# Patient Record
Sex: FEMALE | Race: BLACK OR AFRICAN AMERICAN | NOT HISPANIC OR LATINO | URBAN - METROPOLITAN AREA
[De-identification: names, ages, dates, MRNs, and addresses within clinical notes are randomized per-mention and may not be internally consistent; named-entity substitution may affect disease eponyms.]

---

## 2020-08-10 ENCOUNTER — OFFICE VISIT (OUTPATIENT)
Dept: URBAN - METROPOLITAN AREA CLINIC 25 | Facility: CLINIC | Age: 40
End: 2020-08-10
Payer: COMMERCIAL

## 2020-08-10 ENCOUNTER — LAB OUTSIDE AN ENCOUNTER (OUTPATIENT)
Dept: URBAN - METROPOLITAN AREA CLINIC 25 | Facility: CLINIC | Age: 40
End: 2020-08-10

## 2020-08-10 DIAGNOSIS — R14.0 BLOATING AND GAS: ICD-10-CM

## 2020-08-10 DIAGNOSIS — R10.10 UPPER ABDOMINAL PAIN: ICD-10-CM

## 2020-08-10 DIAGNOSIS — K59.01 SLOW TRANSIT CONSTIPATION: ICD-10-CM

## 2020-08-10 DIAGNOSIS — K21.9 GASTROESOPHAGEAL REFLUX DISEASE, ESOPHAGITIS PRESENCE NOT SPECIFIED: ICD-10-CM

## 2020-08-10 PROCEDURE — G9903 PT SCRN TBCO ID AS NON USER: HCPCS | Performed by: INTERNAL MEDICINE

## 2020-08-10 PROCEDURE — G8417 CALC BMI ABV UP PARAM F/U: HCPCS | Performed by: INTERNAL MEDICINE

## 2020-08-10 PROCEDURE — G8427 DOCREV CUR MEDS BY ELIG CLIN: HCPCS | Performed by: INTERNAL MEDICINE

## 2020-08-10 PROCEDURE — 99204 OFFICE O/P NEW MOD 45 MIN: CPT | Performed by: INTERNAL MEDICINE

## 2020-08-10 RX ORDER — PLECANATIDE 3 MG/1
1 TABLET TABLET ORAL ONCE A DAY
Qty: 90 | Refills: 2 | OUTPATIENT
Start: 2020-08-10

## 2020-08-10 NOTE — HPI-TODAY'S VISIT:
AUG 2020 :  upper abdominal pain, moves to right and left side. ongoing couple of weeks. started PPI 2 weeks ago.  typical retrosternal burning symptoms. no dysphagia. normal colonoscopy in 2013.  normal CT scan in 2013. chronic constipation. no rectal bleeding. maternal grandmother had colon cancer. no prior EGD.

## 2020-08-10 NOTE — PHYSICAL EXAM GASTROINTESTINAL
Abdomen , soft, obese,  epigastrium tenderness, , nondistended , no guarding or rigidity , no masses palpable , normal bowel sounds , Liver and Spleen , no hepatomegaly present , liver nontender , spleen not palpable

## 2020-08-14 ENCOUNTER — LAB OUTSIDE AN ENCOUNTER (OUTPATIENT)
Dept: URBAN - METROPOLITAN AREA CLINIC 92 | Facility: CLINIC | Age: 40
End: 2020-08-14

## 2020-08-14 ENCOUNTER — TELEPHONE ENCOUNTER (OUTPATIENT)
Dept: URBAN - METROPOLITAN AREA CLINIC 92 | Facility: CLINIC | Age: 40
End: 2020-08-14

## 2020-08-14 ENCOUNTER — LAB OUTSIDE AN ENCOUNTER (OUTPATIENT)
Dept: URBAN - METROPOLITAN AREA CLINIC 25 | Facility: CLINIC | Age: 40
End: 2020-08-14

## 2020-08-21 ENCOUNTER — TELEPHONE ENCOUNTER (OUTPATIENT)
Dept: URBAN - METROPOLITAN AREA CLINIC 92 | Facility: CLINIC | Age: 40
End: 2020-08-21

## 2020-08-21 ENCOUNTER — LAB OUTSIDE AN ENCOUNTER (OUTPATIENT)
Dept: URBAN - METROPOLITAN AREA CLINIC 25 | Facility: CLINIC | Age: 40
End: 2020-08-21

## 2020-08-27 ENCOUNTER — OFFICE VISIT (OUTPATIENT)
Dept: URBAN - METROPOLITAN AREA SURGERY CENTER 20 | Facility: SURGERY CENTER | Age: 40
End: 2020-08-27
Payer: COMMERCIAL

## 2020-08-27 DIAGNOSIS — K29.60 ADENOPAPILLOMATOSIS GASTRICA: ICD-10-CM

## 2020-08-27 PROCEDURE — G8907 PT DOC NO EVENTS ON DISCHARG: HCPCS | Performed by: INTERNAL MEDICINE

## 2020-08-27 PROCEDURE — 43239 EGD BIOPSY SINGLE/MULTIPLE: CPT | Performed by: INTERNAL MEDICINE

## 2020-08-27 RX ORDER — PLECANATIDE 3 MG/1
1 TABLET TABLET ORAL ONCE A DAY
Qty: 90 | Refills: 2 | Status: ACTIVE | COMMUNITY
Start: 2020-08-10

## 2020-09-01 ENCOUNTER — TELEPHONE ENCOUNTER (OUTPATIENT)
Dept: URBAN - METROPOLITAN AREA CLINIC 92 | Facility: CLINIC | Age: 40
End: 2020-09-01

## 2020-09-01 ENCOUNTER — WEB ENCOUNTER (OUTPATIENT)
Dept: URBAN - METROPOLITAN AREA CLINIC 92 | Facility: CLINIC | Age: 40
End: 2020-09-01

## 2020-09-10 ENCOUNTER — TELEPHONE ENCOUNTER (OUTPATIENT)
Dept: URBAN - METROPOLITAN AREA CLINIC 25 | Facility: CLINIC | Age: 40
End: 2020-09-10

## 2020-09-16 ENCOUNTER — TELEPHONE ENCOUNTER (OUTPATIENT)
Dept: URBAN - METROPOLITAN AREA CLINIC 25 | Facility: CLINIC | Age: 40
End: 2020-09-16

## 2020-09-22 ENCOUNTER — TELEPHONE ENCOUNTER (OUTPATIENT)
Dept: URBAN - METROPOLITAN AREA CLINIC 25 | Facility: CLINIC | Age: 40
End: 2020-09-22

## 2020-09-25 ENCOUNTER — OFFICE VISIT (OUTPATIENT)
Dept: URBAN - METROPOLITAN AREA CLINIC 25 | Facility: CLINIC | Age: 40
End: 2020-09-25
Payer: COMMERCIAL

## 2020-09-25 DIAGNOSIS — K21.9 GASTROESOPHAGEAL REFLUX DISEASE, ESOPHAGITIS PRESENCE NOT SPECIFIED: ICD-10-CM

## 2020-09-25 DIAGNOSIS — R10.10 UPPER ABDOMINAL PAIN: ICD-10-CM

## 2020-09-25 DIAGNOSIS — K59.01 SLOW TRANSIT CONSTIPATION: ICD-10-CM

## 2020-09-25 DIAGNOSIS — R14.0 BLOATING AND GAS: ICD-10-CM

## 2020-09-25 DIAGNOSIS — R93.5 ABNORMAL ABDOMINAL ULTRASOUND: ICD-10-CM

## 2020-09-25 PROCEDURE — 99213 OFFICE O/P EST LOW 20 MIN: CPT | Performed by: INTERNAL MEDICINE

## 2020-09-25 PROCEDURE — G9903 PT SCRN TBCO ID AS NON USER: HCPCS | Performed by: INTERNAL MEDICINE

## 2020-09-25 PROCEDURE — G8427 DOCREV CUR MEDS BY ELIG CLIN: HCPCS | Performed by: INTERNAL MEDICINE

## 2020-09-25 PROCEDURE — G8417 CALC BMI ABV UP PARAM F/U: HCPCS | Performed by: INTERNAL MEDICINE

## 2020-09-25 RX ORDER — ASPIRIN 81 MG/1
1 TABLET TABLET, CHEWABLE ORAL ONCE A DAY
Status: ACTIVE | COMMUNITY

## 2020-09-25 RX ORDER — PLECANATIDE 3 MG/1
1 TABLET TABLET ORAL ONCE A DAY
Qty: 90 | Refills: 2 | OUTPATIENT

## 2020-09-25 RX ORDER — PLECANATIDE 3 MG/1
1 TABLET TABLET ORAL ONCE A DAY
Qty: 90 | Refills: 2 | Status: ACTIVE | COMMUNITY
Start: 2020-08-10

## 2020-09-25 NOTE — HPI-TODAY'S VISIT:
Sept 2020:   trying to lose weight with changing diet. trulance was working. constipation better with trulance. forgot to schedule CT scan with iv contrast.  has tried miralax, stool softners, dulcolax , fiber, nothing works.

## 2020-09-25 NOTE — HPI-OTHER HISTORIES
AUG 2020 : upper abdominal pain, moves to right and left side. ongoing couple of weeks. started PPI 2 weeks ago.  typical retrosternal burning symptoms. no dysphagia. normal colonoscopy in 2013.  normal CT scan in 2013. chronic constipation. no rectal bleeding. maternal grandmother had colon cancer. no prior EGD. Abdominal ultrasound in August 2020 revealed no gallstones or biliary ductal dilation but a prominent lymph node was seen in the yoly hepatis measuring approximately 3.5 x 2.2 x 2 cm, fatty liver with mild liver enlargement.  The patient was advised to undergo a CT scan of the abdomen and pelvis with contrast but she refused IV contrast and therefore a CT was done without contrast which revealed fatty liver but lesion in the yoly hepatis was not enhanced. EGD August 2020 was normal.  Path did not reveal any H. pylori infection.  Colonoscopy in 2013 was normal.

## 2020-10-08 ENCOUNTER — LAB OUTSIDE AN ENCOUNTER (OUTPATIENT)
Dept: URBAN - METROPOLITAN AREA CLINIC 25 | Facility: CLINIC | Age: 40
End: 2020-10-08

## 2020-10-09 ENCOUNTER — TELEPHONE ENCOUNTER (OUTPATIENT)
Dept: URBAN - METROPOLITAN AREA CLINIC 92 | Facility: CLINIC | Age: 40
End: 2020-10-09

## 2020-10-19 ENCOUNTER — OFFICE VISIT (OUTPATIENT)
Dept: URBAN - METROPOLITAN AREA CLINIC 25 | Facility: CLINIC | Age: 40
End: 2020-10-19
Payer: COMMERCIAL

## 2020-10-19 DIAGNOSIS — R93.5 ABNORMAL ABDOMINAL ULTRASOUND: ICD-10-CM

## 2020-10-19 DIAGNOSIS — K59.01 SLOW TRANSIT CONSTIPATION: ICD-10-CM

## 2020-10-19 DIAGNOSIS — K21.9 GASTROESOPHAGEAL REFLUX DISEASE, ESOPHAGITIS PRESENCE NOT SPECIFIED: ICD-10-CM

## 2020-10-19 DIAGNOSIS — R10.10 UPPER ABDOMINAL PAIN: ICD-10-CM

## 2020-10-19 DIAGNOSIS — R14.0 BLOATING AND GAS: ICD-10-CM

## 2020-10-19 PROCEDURE — 99213 OFFICE O/P EST LOW 20 MIN: CPT | Performed by: INTERNAL MEDICINE

## 2020-10-19 PROCEDURE — G8427 DOCREV CUR MEDS BY ELIG CLIN: HCPCS | Performed by: INTERNAL MEDICINE

## 2020-10-19 PROCEDURE — G8417 CALC BMI ABV UP PARAM F/U: HCPCS | Performed by: INTERNAL MEDICINE

## 2020-10-19 PROCEDURE — G9903 PT SCRN TBCO ID AS NON USER: HCPCS | Performed by: INTERNAL MEDICINE

## 2020-10-19 PROCEDURE — G8482 FLU IMMUNIZE ORDER/ADMIN: HCPCS | Performed by: INTERNAL MEDICINE

## 2020-10-19 RX ORDER — ASPIRIN 81 MG/1
1 TABLET TABLET, CHEWABLE ORAL ONCE A DAY
Status: ACTIVE | COMMUNITY

## 2020-10-19 RX ORDER — PLECANATIDE 3 MG/1
1 TABLET TABLET ORAL ONCE A DAY
Qty: 90 | Refills: 2 | Status: ACTIVE | COMMUNITY

## 2020-10-19 RX ORDER — FAMOTIDINE 20 MG/1
1 TABLET TABLET, FILM COATED ORAL
Qty: 180 | Refills: 2 | OUTPATIENT
Start: 2020-10-19

## 2020-10-19 NOTE — HPI-TODAY'S VISIT:
Oct 2020: CT scan abdomen pelvis with contrast October 2020 revealed no abnormality in the yoly hepatis.  No acute abnormality, fatty liver, left-sided ovarian cyst which is likely physiological.  trulance works very well. losing weight by eating healthy. exercising more. mild upper abdominal pain only with certain foods.

## 2020-10-19 NOTE — HPI-OTHER HISTORIES
Sept 2020: trying to lose weight with changing diet. trulance was working. constipation better with trulance. forgot to schedule CT scan with iv contrast.  has tried miralax, stool softners, dulcolax , fiber, nothing works.    AUG 2020 : upper abdominal pain, moves to right and left side. ongoing couple of weeks. started PPI 2 weeks ago.  typical retrosternal burning symptoms. no dysphagia. normal colonoscopy in 2013.  normal CT scan in 2013. chronic constipation. no rectal bleeding. maternal grandmother had colon cancer. no prior EGD. Abdominal ultrasound in August 2020 revealed no gallstones or biliary ductal dilation but a prominent lymph node was seen in the yoly hepatis measuring approximately 3.5 x 2.2 x 2 cm, fatty liver with mild liver enlargement.  The patient was advised to undergo a CT scan of the abdomen and pelvis with contrast but she refused IV contrast and therefore a CT was done without contrast which revealed fatty liver but lesion in the yoly hepatis was not enhanced.  EGD August 2020 was normal.  Path did not reveal any H. pylori infection.  Colonoscopy in 2013 was normal.

## 2021-01-18 ENCOUNTER — OFFICE VISIT (OUTPATIENT)
Dept: URBAN - METROPOLITAN AREA CLINIC 25 | Facility: CLINIC | Age: 41
End: 2021-01-18

## 2021-01-22 ENCOUNTER — OFFICE VISIT (OUTPATIENT)
Dept: URBAN - METROPOLITAN AREA CLINIC 25 | Facility: CLINIC | Age: 41
End: 2021-01-22
Payer: COMMERCIAL

## 2021-01-22 ENCOUNTER — LAB OUTSIDE AN ENCOUNTER (OUTPATIENT)
Dept: URBAN - METROPOLITAN AREA CLINIC 25 | Facility: CLINIC | Age: 41
End: 2021-01-22

## 2021-01-22 ENCOUNTER — WEB ENCOUNTER (OUTPATIENT)
Dept: URBAN - METROPOLITAN AREA CLINIC 25 | Facility: CLINIC | Age: 41
End: 2021-01-22

## 2021-01-22 VITALS
WEIGHT: 241 LBS | HEART RATE: 74 BPM | TEMPERATURE: 97.9 F | SYSTOLIC BLOOD PRESSURE: 130 MMHG | DIASTOLIC BLOOD PRESSURE: 87 MMHG | BODY MASS INDEX: 40.15 KG/M2 | HEIGHT: 65 IN

## 2021-01-22 DIAGNOSIS — K21.9 GASTROESOPHAGEAL REFLUX DISEASE, ESOPHAGITIS PRESENCE NOT SPECIFIED: ICD-10-CM

## 2021-01-22 DIAGNOSIS — R10.10 UPPER ABDOMINAL PAIN: ICD-10-CM

## 2021-01-22 DIAGNOSIS — R14.0 BLOATING AND GAS: ICD-10-CM

## 2021-01-22 DIAGNOSIS — K59.01 SLOW TRANSIT CONSTIPATION: ICD-10-CM

## 2021-01-22 DIAGNOSIS — K76.0 FATTY LIVER DISEASE, NONALCOHOLIC: ICD-10-CM

## 2021-01-22 PROCEDURE — G8427 DOCREV CUR MEDS BY ELIG CLIN: HCPCS | Performed by: INTERNAL MEDICINE

## 2021-01-22 PROCEDURE — G9903 PT SCRN TBCO ID AS NON USER: HCPCS | Performed by: INTERNAL MEDICINE

## 2021-01-22 PROCEDURE — 99214 OFFICE O/P EST MOD 30 MIN: CPT | Performed by: INTERNAL MEDICINE

## 2021-01-22 PROCEDURE — G8417 CALC BMI ABV UP PARAM F/U: HCPCS | Performed by: INTERNAL MEDICINE

## 2021-01-22 PROCEDURE — G8483 FLU IMM NO ADMIN DOC REA: HCPCS | Performed by: INTERNAL MEDICINE

## 2021-01-22 RX ORDER — ASPIRIN 81 MG/1
1 TABLET TABLET, CHEWABLE ORAL ONCE A DAY
Status: ACTIVE | COMMUNITY

## 2021-01-22 RX ORDER — PLECANATIDE 3 MG/1
1 TABLET TABLET ORAL ONCE A DAY
Qty: 90 | Refills: 2 | Status: ACTIVE | COMMUNITY

## 2021-01-22 RX ORDER — PLECANATIDE 3 MG/1
1 TABLET TABLET ORAL ONCE A DAY
Qty: 90 | Refills: 2 | OUTPATIENT
Start: 2021-01-22

## 2021-01-22 RX ORDER — FAMOTIDINE 20 MG/1
1 TABLET TABLET, FILM COATED ORAL
Qty: 180 | Refills: 2 | Status: DISCONTINUED | COMMUNITY
Start: 2020-10-19

## 2021-01-22 NOTE — HPI-TODAY'S VISIT:
Jan 2021 visit :  trulance is working well. eating better. constipation worse with certain foods. no rectal bleeding. 1 grandparent had colon cancer.

## 2021-01-22 NOTE — HPI-OTHER HISTORIES
Oct 2020: CT scan abdomen pelvis with contrast October 2020 revealed no abnormality in the yoly hepatis.  No acute abnormality, fatty liver, left-sided ovarian cyst which is likely physiological.  trulance works very well. losing weight by eating healthy. exercising more. mild upper abdominal pain only with certain foods.  Sept 2020: trying to lose weight with changing diet. trulance was working. constipation better with trulance. forgot to schedule CT scan with iv contrast.  has tried miralax, stool softners, dulcolax , fiber, nothing works.    AUG 2020 : upper abdominal pain, moves to right and left side. ongoing couple of weeks. started PPI 2 weeks ago.  typical retrosternal burning symptoms. no dysphagia. normal colonoscopy in 2013.  normal CT scan in 2013. chronic constipation. no rectal bleeding. maternal grandmother had colon cancer. no prior EGD. Abdominal ultrasound in August 2020 revealed no gallstones or biliary ductal dilation but a prominent lymph node was seen in the yoly hepatis measuring approximately 3.5 x 2.2 x 2 cm, fatty liver with mild liver enlargement.  The patient was advised to undergo a CT scan of the abdomen and pelvis with contrast but she refused IV contrast and therefore a CT was done without contrast which revealed fatty liver but lesion in the yoly hepatis was not enhanced.  EGD August 2020 was normal.  Path did not reveal any H. pylori infection.  Colonoscopy in 2013 was normal.

## 2021-03-23 ENCOUNTER — OFFICE VISIT (OUTPATIENT)
Dept: URBAN - METROPOLITAN AREA SURGERY CENTER 20 | Facility: SURGERY CENTER | Age: 41
End: 2021-03-23

## 2021-03-23 RX ORDER — PLECANATIDE 3 MG/1
1 TABLET TABLET ORAL ONCE A DAY
Qty: 90 | Refills: 2 | Status: ACTIVE | COMMUNITY
Start: 2021-01-22

## 2021-03-23 RX ORDER — PLECANATIDE 3 MG/1
1 TABLET TABLET ORAL ONCE A DAY
Qty: 90 | Refills: 2 | Status: ACTIVE | COMMUNITY

## 2021-03-23 RX ORDER — ASPIRIN 81 MG/1
1 TABLET TABLET, CHEWABLE ORAL ONCE A DAY
Status: ACTIVE | COMMUNITY

## 2021-04-08 ENCOUNTER — OFFICE VISIT (OUTPATIENT)
Dept: URBAN - METROPOLITAN AREA SURGERY CENTER 20 | Facility: SURGERY CENTER | Age: 41
End: 2021-04-08
Payer: COMMERCIAL

## 2021-04-08 DIAGNOSIS — K59.01 SLOW TRANSIT CONSTIPATION: ICD-10-CM

## 2021-04-08 DIAGNOSIS — R10.84 ABDOMINAL CRAMPING, GENERALIZED: ICD-10-CM

## 2021-04-08 PROCEDURE — G8907 PT DOC NO EVENTS ON DISCHARG: HCPCS | Performed by: INTERNAL MEDICINE

## 2021-04-08 PROCEDURE — 45378 DIAGNOSTIC COLONOSCOPY: CPT | Performed by: INTERNAL MEDICINE

## 2021-04-08 RX ORDER — PLECANATIDE 3 MG/1
1 TABLET TABLET ORAL ONCE A DAY
Qty: 90 | Refills: 2 | Status: ACTIVE | COMMUNITY
Start: 2021-01-22

## 2021-04-08 RX ORDER — ASPIRIN 81 MG/1
1 TABLET TABLET, CHEWABLE ORAL ONCE A DAY
Status: ACTIVE | COMMUNITY

## 2021-04-08 RX ORDER — PLECANATIDE 3 MG/1
1 TABLET TABLET ORAL ONCE A DAY
Qty: 90 | Refills: 2 | Status: ACTIVE | COMMUNITY

## 2021-10-15 ENCOUNTER — OFFICE VISIT (OUTPATIENT)
Dept: URBAN - METROPOLITAN AREA CLINIC 25 | Facility: CLINIC | Age: 41
End: 2021-10-15

## 2021-10-15 RX ORDER — PLECANATIDE 3 MG/1
1 TABLET TABLET ORAL ONCE A DAY
Qty: 90 | Refills: 2 | OUTPATIENT

## 2021-10-15 RX ORDER — PLECANATIDE 3 MG/1
1 TABLET TABLET ORAL ONCE A DAY
Qty: 90 | Refills: 2 | Status: ACTIVE | COMMUNITY
Start: 2021-01-22

## 2021-10-15 RX ORDER — ASPIRIN 81 MG/1
1 TABLET TABLET, CHEWABLE ORAL ONCE A DAY
Status: ACTIVE | COMMUNITY

## 2021-10-15 RX ORDER — PLECANATIDE 3 MG/1
1 TABLET TABLET ORAL ONCE A DAY
Qty: 90 | Refills: 2 | Status: ACTIVE | COMMUNITY

## 2021-10-15 NOTE — HPI-OTHER HISTORIES
Jan 2021 visit :  trulance is working well. eating better. constipation worse with certain foods. no rectal bleeding. 1 grandparent had colon cancer.  Oct 2020: CT scan abdomen pelvis with contrast October 2020 revealed no abnormality in the yoly hepatis.  No acute abnormality, fatty liver, left-sided ovarian cyst which is likely physiological.  trulance works very well. losing weight by eating healthy. exercising more. mild upper abdominal pain only with certain foods.  Sept 2020: trying to lose weight with changing diet. trulance was working. constipation better with trulance. forgot to schedule CT scan with iv contrast.  has tried miralax, stool softners, dulcolax , fiber, nothing works.    AUG 2020 : upper abdominal pain, moves to right and left side. ongoing couple of weeks. started PPI 2 weeks ago.  typical retrosternal burning symptoms. no dysphagia. normal colonoscopy in 2013.  normal CT scan in 2013. chronic constipation. no rectal bleeding. maternal grandmother had colon cancer. no prior EGD. Abdominal ultrasound in August 2020 revealed no gallstones or biliary ductal dilation but a prominent lymph node was seen in the yoly hepatis measuring approximately 3.5 x 2.2 x 2 cm, fatty liver with mild liver enlargement.  The patient was advised to undergo a CT scan of the abdomen and pelvis with contrast but she refused IV contrast and therefore a CT was done without contrast which revealed fatty liver but lesion in the yoly hepatis was not enhanced.  EGD August 2020 was normal.  Path did not reveal any H. pylori infection.  Colonoscopy in 2013 was normal.

## 2021-11-22 ENCOUNTER — WEB ENCOUNTER (OUTPATIENT)
Dept: URBAN - METROPOLITAN AREA CLINIC 25 | Facility: CLINIC | Age: 41
End: 2021-11-22

## 2021-11-22 ENCOUNTER — OFFICE VISIT (OUTPATIENT)
Dept: URBAN - METROPOLITAN AREA CLINIC 25 | Facility: CLINIC | Age: 41
End: 2021-11-22
Payer: COMMERCIAL

## 2021-11-22 DIAGNOSIS — K59.04 CHRONIC IDIOPATHIC CONSTIPATION: ICD-10-CM

## 2021-11-22 DIAGNOSIS — K21.9 GASTROESOPHAGEAL REFLUX DISEASE, ESOPHAGITIS PRESENCE NOT SPECIFIED: ICD-10-CM

## 2021-11-22 DIAGNOSIS — R14.0 BLOATING AND GAS: ICD-10-CM

## 2021-11-22 DIAGNOSIS — K76.0 FATTY LIVER DISEASE, NONALCOHOLIC: ICD-10-CM

## 2021-11-22 DIAGNOSIS — R10.10 UPPER ABDOMINAL PAIN: ICD-10-CM

## 2021-11-22 PROBLEM — 35298007 SLOW TRANSIT CONSTIPATION: Status: ACTIVE | Noted: 2020-08-10

## 2021-11-22 PROCEDURE — 99213 OFFICE O/P EST LOW 20 MIN: CPT | Performed by: INTERNAL MEDICINE

## 2021-11-22 RX ORDER — PLECANATIDE 3 MG/1
1 TABLET TABLET ORAL ONCE A DAY
Qty: 90 | Refills: 2 | OUTPATIENT
Start: 2021-11-22 | End: 2022-08-19

## 2021-11-22 RX ORDER — PLECANATIDE 3 MG/1
1 TABLET TABLET ORAL ONCE A DAY
Qty: 90 | Refills: 2 | Status: ACTIVE | COMMUNITY

## 2021-11-22 RX ORDER — ASPIRIN 81 MG/1
1 TABLET TABLET, CHEWABLE ORAL ONCE A DAY
Status: ACTIVE | COMMUNITY

## 2021-11-22 NOTE — HPI-TODAY'S VISIT:
November 2021 visit:   Patient underwent a colonoscopy in April 2021 which revealed a somewhat suboptimal bowel prep, repeat colonoscopy was advised in 5 years.  Trulance works. Losing weight with better eating habits. recovered from covid 19 in summer 2021. was hospitalized. no gerd symptoms

## 2022-08-26 ENCOUNTER — OFFICE VISIT (OUTPATIENT)
Dept: URBAN - METROPOLITAN AREA CLINIC 25 | Facility: CLINIC | Age: 42
End: 2022-08-26
Payer: COMMERCIAL

## 2022-08-26 ENCOUNTER — WEB ENCOUNTER (OUTPATIENT)
Dept: URBAN - METROPOLITAN AREA CLINIC 25 | Facility: CLINIC | Age: 42
End: 2022-08-26

## 2022-08-26 ENCOUNTER — DASHBOARD ENCOUNTERS (OUTPATIENT)
Age: 42
End: 2022-08-26

## 2022-08-26 VITALS
WEIGHT: 241 LBS | HEART RATE: 76 BPM | TEMPERATURE: 98.2 F | HEIGHT: 65 IN | BODY MASS INDEX: 40.15 KG/M2 | SYSTOLIC BLOOD PRESSURE: 151 MMHG | DIASTOLIC BLOOD PRESSURE: 97 MMHG

## 2022-08-26 DIAGNOSIS — K21.9 GASTROESOPHAGEAL REFLUX DISEASE, ESOPHAGITIS PRESENCE NOT SPECIFIED: ICD-10-CM

## 2022-08-26 DIAGNOSIS — K59.04 CHRONIC IDIOPATHIC CONSTIPATION: ICD-10-CM

## 2022-08-26 DIAGNOSIS — K76.0 FATTY LIVER DISEASE, NONALCOHOLIC: ICD-10-CM

## 2022-08-26 PROBLEM — 197315008 NAFLD - NONALCOHOLIC FATTY LIVER DISEASE: Status: ACTIVE | Noted: 2021-01-22

## 2022-08-26 PROBLEM — 443381000124105 OBESE CLASS II: Status: ACTIVE | Noted: 2021-10-12

## 2022-08-26 PROBLEM — 408512008: Status: ACTIVE | Noted: 2022-08-26

## 2022-08-26 PROBLEM — 82934008 CHRONIC IDIOPATHIC CONSTIPATION: Status: ACTIVE | Noted: 2021-11-22

## 2022-08-26 PROBLEM — 235595009 GASTROESOPHAGEAL REFLUX DISEASE: Status: ACTIVE | Noted: 2021-10-12

## 2022-08-26 PROCEDURE — 99213 OFFICE O/P EST LOW 20 MIN: CPT | Performed by: INTERNAL MEDICINE

## 2022-08-26 RX ORDER — ASPIRIN 81 MG/1
1 TABLET TABLET, CHEWABLE ORAL ONCE A DAY
Status: ACTIVE | COMMUNITY

## 2022-08-26 RX ORDER — PLECANATIDE 3 MG/1
1 TABLET TABLET ORAL ONCE A DAY
Qty: 90 | Refills: 2 | Status: ACTIVE | COMMUNITY

## 2022-08-26 NOTE — HPI-OTHER HISTORIES
November 2021 visit:   Patient underwent a colonoscopy in April 2021 which revealed a somewhat suboptimal bowel prep, repeat colonoscopy was advised in 5 years.  Trulance works. Losing weight with better eating habits. recovered from covid 19 in summer 2021. was hospitalized. no gerd symptoms  Jan 2021 visit :  trulance is working well. eating better. constipation worse with certain foods. no rectal bleeding. 1 grandparent had colon cancer.  Oct 2020: CT scan abdomen pelvis with contrast October 2020 revealed no abnormality in the yoly hepatis.  No acute abnormality, fatty liver, left-sided ovarian cyst which is likely physiological.  trulance works very well. losing weight by eating healthy. exercising more. mild upper abdominal pain only with certain foods.  Sept 2020: trying to lose weight with changing diet. trulance was working. constipation better with trulance. forgot to schedule CT scan with iv contrast.  has tried miralax, stool softners, dulcolax , fiber, nothing works.    AUG 2020 : upper abdominal pain, moves to right and left side. ongoing couple of weeks. started PPI 2 weeks ago.  typical retrosternal burning symptoms. no dysphagia. normal colonoscopy in 2013.  normal CT scan in 2013. chronic constipation. no rectal bleeding. maternal grandmother had colon cancer. no prior EGD. Abdominal ultrasound in August 2020 revealed no gallstones or biliary ductal dilation but a prominent lymph node was seen in the yoly hepatis measuring approximately 3.5 x 2.2 x 2 cm, fatty liver with mild liver enlargement.  The patient was advised to undergo a CT scan of the abdomen and pelvis with contrast but she refused IV contrast and therefore a CT was done without contrast which revealed fatty liver but lesion in the yoly hepatis was not enhanced.  EGD August 2020 was normal.  Path did not reveal any H. pylori infection.  Colonoscopy in 2013 was normal.